# Patient Record
Sex: MALE | Race: WHITE | Employment: FULL TIME | URBAN - METROPOLITAN AREA
[De-identification: names, ages, dates, MRNs, and addresses within clinical notes are randomized per-mention and may not be internally consistent; named-entity substitution may affect disease eponyms.]

---

## 2023-11-10 ENCOUNTER — OFFICE VISIT (OUTPATIENT)
Dept: ORTHOPEDIC SURGERY | Age: 43
End: 2023-11-10

## 2023-11-10 DIAGNOSIS — M77.8 TRICEPS TENDONITIS: ICD-10-CM

## 2023-11-10 DIAGNOSIS — M25.521 RIGHT ELBOW PAIN: Primary | ICD-10-CM

## 2023-11-10 RX ORDER — PREDNISONE 20 MG/1
20 TABLET ORAL DAILY
Qty: 7 TABLET | Refills: 0 | Status: SHIPPED | OUTPATIENT
Start: 2023-11-10 | End: 2023-11-17

## 2023-11-10 NOTE — PROGRESS NOTES
[facility]  PRIMARY CARE SPORTS MEDICINE  DATE OF VISIT : 11/10/2023    Patient : Keyana Ronquillo  Age : 41 y.o.  : 1980  MRN : [id number]   ______________________________________________________________________    Chief Complaint :   Chief Complaint   Patient presents with    Elbow Pain     Right elbow pain. Pain started in 2023 after participating in the greatest gol[employer]. Patient states that after the first round he had to withdraw due to pain. Patient has tried OTC with no relief. Patient need XR. Patient had completed exercises with Dr. Beulah Kruger. No formal PT. HPI : Keyana Ronquillo is 41 y.o. male who presented to the clinic today for evaluation of right elbow pain. Onset of the symptoms was several months ago, with no known mechanism of injury. Current symptoms include right posterior elbow pain. Patient denies numbness and tingling. Pain is aggravated by any elbow extension. Evaluation to date: XRs of the right elbow demonstrate no acute fractures or dislocations. Treatment to date: avoidance of offending activity and OTC analgesics which are not very effective. Past Medical History :  No past medical history on file. No past surgical history on file. Allergies :   No Known Allergies    Medication List :    No current outpatient medications on file. No current facility-administered medications for this visit.      ______________________________________________________________________    Physical Exam :    Vitals: There were no vitals taken for this visit. General Appearance: Nontoxic, awake, alert, and in no acute distress. Chest wall/Lung: Respirations regular and unlabored. No cyanosis. Heart: RRR, distal pulses intact. Neurologic: Alert&Oriented x3. Sensation grossly intact. No focal motor deficits detected. Musculoskeletal: RIGHT Elbow:  ROM - flexion to 140, extension to 0, supination to 90, pronation to 90. Stable to varus and valgus stress.   TTP: ( - ) Medial